# Patient Record
(demographics unavailable — no encounter records)

---

## 2024-11-26 NOTE — HISTORY OF PRESENT ILLNESS
[FreeTextEntry1] : 63 yo female here for annual PE. Was seen 2 mths ago with swelling in ankles- amlodipine was stopped and she was switched to Lisinopril. Also taking HCTZ 25 mg. Feels a little better with the swelling.

## 2024-11-26 NOTE — PHYSICAL EXAM
[No Acute Distress] : no acute distress [Well Nourished] : well nourished [Well Developed] : well developed [Well-Appearing] : well-appearing [Normal Sclera/Conjunctiva] : normal sclera/conjunctiva [PERRL] : pupils equal round and reactive to light [EOMI] : extraocular movements intact [Normal Outer Ear/Nose] : the outer ears and nose were normal in appearance [Normal Oropharynx] : the oropharynx was normal [No JVD] : no jugular venous distention [No Lymphadenopathy] : no lymphadenopathy [Supple] : supple [Thyroid Normal, No Nodules] : the thyroid was normal and there were no nodules present [No Respiratory Distress] : no respiratory distress  [No Accessory Muscle Use] : no accessory muscle use [Clear to Auscultation] : lungs were clear to auscultation bilaterally [Normal Rate] : normal rate  [Regular Rhythm] : with a regular rhythm [Normal S1, S2] : normal S1 and S2 [No Murmur] : no murmur heard [No Carotid Bruits] : no carotid bruits [No Abdominal Bruit] : a ~M bruit was not heard ~T in the abdomen [No Varicosities] : no varicosities [Pedal Pulses Present] : the pedal pulses are present [No Palpable Aorta] : no palpable aorta [No Extremity Clubbing/Cyanosis] : no extremity clubbing/cyanosis [Soft] : abdomen soft [Non Tender] : non-tender [Non-distended] : non-distended [No HSM] : no HSM [Normal Bowel Sounds] : normal bowel sounds [Normal Posterior Cervical Nodes] : no posterior cervical lymphadenopathy [Normal Anterior Cervical Nodes] : no anterior cervical lymphadenopathy [No CVA Tenderness] : no CVA  tenderness [No Spinal Tenderness] : no spinal tenderness [No Joint Swelling] : no joint swelling [Grossly Normal Strength/Tone] : grossly normal strength/tone [No Rash] : no rash [Coordination Grossly Intact] : coordination grossly intact [No Focal Deficits] : no focal deficits [Normal Gait] : normal gait [Deep Tendon Reflexes (DTR)] : deep tendon reflexes were 2+ and symmetric [Normal Affect] : the affect was normal [Normal Insight/Judgement] : insight and judgment were intact [Normal Appearance] : normal in appearance [No Masses] : no palpable masses [de-identified] : mild edema in ankles

## 2024-11-26 NOTE — PLAN
[FreeTextEntry1] : bw done. LISA mildly elevated; pt's sister has lupus but pt does not have any sxs. Will  just observe and if sxs develop ref to Rheum  cont meds advised healthy diet/exercise discussed vaccines- had flu vaccine at work. Had Shingrix 2022.  Depression screen done & reviewed 5 minutes  HTN- BP stable. cont Lisinopril Edema- better. cont HCTZ 25mg  Osteoporosis- pt was just started on alendronate 70 mg  high chol- level a little elevated, 200. Pt takes simvastatin 20 mg regularly. Will watch diet and try to exercise more and f/u in 3-4 mths to recheck. Check CT calcium score Prediabetes- A1C 5.8, will try to cut down on sugars and carbs.

## 2024-11-26 NOTE — HEALTH RISK ASSESSMENT
[Good] : ~his/her~  mood as  good [Never (0 pts)] : Never (0 points) [No falls in past year] : Patient reported no falls in the past year [PHQ-2 Negative - No further assessment needed] : PHQ-2 Negative - No further assessment needed [Time Spent: ___ Minutes] : I spent [unfilled] minutes performing a depression screening for this patient. [Never] : Never [MammogramDate] : 10/24 [PapSmearDate] : 10/24 [BoneDensityDate] : 10/24 [ColonoscopyDate] : 12/19

## 2024-12-12 NOTE — HISTORY OF PRESENT ILLNESS
[FreeTextEntry1] : She has bilateral calf and ankle swelling for approximately three years. The swelling extends into her feet but not into her toes. She has no varicose veins and has never had phlebitis or DVT. She was taking amlodipine for three years but stopped it 3 months ago. The swelling has improved but not resolved since stopping the medication.  She has no swelling in the morning, but it develops during the day. Her sister has lupus but she does not. She had an echocardiogram that was normal. She has never worn compression stockings. She is taking a diuretic, and it did not improve for swelling.

## 2024-12-12 NOTE — PHYSICAL EXAM
[JVD] : no jugular venous distention  [Normal Thyroid] : the thyroid was normal [Normal Heart Sounds] : normal heart sounds [2+] : left 2+ [Ankle Swelling (On Exam)] : present [Ankle Swelling Bilaterally] : bilaterally  [Ankle Swelling On The Right] : mild [Varicose Veins Of Lower Extremities] : bilaterally [] : bilaterally [Abdomen Masses] : No abdominal masses [Abdomen Tenderness] : ~T ~M No abdominal tenderness [No Rash or Lesion] : No rash or lesion [Alert] : alert [Oriented to Person] : oriented to person [Oriented to Place] : oriented to place [Oriented to Time] : oriented to time [Calm] : calm [de-identified] : She was a pleasant lady and in no distress. She was moderately obese. [FreeTextEntry1] : There was relatively mild edema and the bilateral lower legs and ankles.  There was no edema in the feet or toes.

## 2024-12-12 NOTE — REVIEW OF SYSTEMS
[As Noted in HPI] : as noted in HPI [Chest Pain] : no chest pain [Palpitations] : no palpitations [Leg Claudication] : no intermittent leg claudication [Negative] : Heme/Lymph